# Patient Record
Sex: FEMALE | Race: WHITE
[De-identification: names, ages, dates, MRNs, and addresses within clinical notes are randomized per-mention and may not be internally consistent; named-entity substitution may affect disease eponyms.]

---

## 2019-04-03 ENCOUNTER — HOSPITAL ENCOUNTER (EMERGENCY)
Dept: HOSPITAL 10 - E/R | Age: 59
Discharge: HOME | End: 2019-04-03
Payer: SELF-PAY

## 2019-04-03 ENCOUNTER — HOSPITAL ENCOUNTER (EMERGENCY)
Dept: HOSPITAL 91 - E/R | Age: 59
Discharge: HOME | End: 2019-04-03
Payer: SELF-PAY

## 2019-04-03 VITALS — RESPIRATION RATE: 18 BRPM | SYSTOLIC BLOOD PRESSURE: 180 MMHG | HEART RATE: 82 BPM | DIASTOLIC BLOOD PRESSURE: 89 MMHG

## 2019-04-03 VITALS
BODY MASS INDEX: 29.62 KG/M2 | WEIGHT: 160.94 LBS | BODY MASS INDEX: 29.62 KG/M2 | HEIGHT: 62 IN | WEIGHT: 160.94 LBS | HEIGHT: 62 IN

## 2019-04-03 DIAGNOSIS — I10: Primary | ICD-10-CM

## 2019-04-03 DIAGNOSIS — R07.9: ICD-10-CM

## 2019-04-03 LAB
ADD MAN DIFF?: NO
ALANINE AMINOTRANSFERASE: 14 IU/L (ref 13–69)
ALBUMIN/GLOBULIN RATIO: 1.21
ALBUMIN: 4.6 G/DL (ref 3.3–4.9)
ALKALINE PHOSPHATASE: 91 IU/L (ref 42–121)
ANION GAP: 11 (ref 5–13)
ASPARTATE AMINO TRANSFERASE: 21 IU/L (ref 15–46)
B-TYPE NATRIURETIC PEPTIDE: 105 PG/ML (ref 0–125)
BASOPHIL #: 0 10^3/UL (ref 0–0.1)
BASOPHILS %: 0.7 % (ref 0–2)
BILIRUBIN,DIRECT: 0 MG/DL (ref 0–0.2)
BILIRUBIN,TOTAL: 0.6 MG/DL (ref 0.2–1.3)
BLOOD UREA NITROGEN: 12 MG/DL (ref 7–20)
CALCIUM: 9.6 MG/DL (ref 8.4–10.2)
CARBON DIOXIDE: 28 MMOL/L (ref 21–31)
CHLORIDE: 104 MMOL/L (ref 97–110)
CK INDEX: 0.9
CK-MB: 0.36 NG/ML (ref 0–2.4)
CREATINE KINASE: 42 IU/L (ref 23–200)
CREATININE: 0.6 MG/DL (ref 0.44–1)
EOSINOPHILS #: 0.1 10^3/UL (ref 0–0.5)
EOSINOPHILS %: 2.1 % (ref 0–7)
FREE THYROXINE INDEX (CALC): 1.99 UG/ML (ref 0.65–3.89)
GLOBULIN: 3.8 G/DL (ref 1.3–3.2)
GLUCOSE: 103 MG/DL (ref 70–220)
HEMATOCRIT: 37.2 % (ref 37–47)
HEMOGLOBIN: 12.2 G/DL (ref 12–16)
IMMATURE GRANS #M: 0.02 10^3/UL (ref 0–0.03)
IMMATURE GRANS % (M): 0.3 % (ref 0–0.43)
INR: 0.93
LYMPHOCYTES #: 2.1 10^3/UL (ref 0.8–2.9)
LYMPHOCYTES %: 36.1 % (ref 15–51)
MEAN CORPUSCULAR HEMOGLOBIN: 30.1 PG (ref 29–33)
MEAN CORPUSCULAR HGB CONC: 32.8 G/DL (ref 32–37)
MEAN CORPUSCULAR VOLUME: 91.9 FL (ref 82–101)
MEAN PLATELET VOLUME: 11.5 FL (ref 7.4–10.4)
MONOCYTE #: 0.4 10^3/UL (ref 0.3–0.9)
MONOCYTES %: 6.3 % (ref 0–11)
NEUTROPHIL #: 3.2 10^3/UL (ref 1.6–7.5)
NEUTROPHILS %: 54.5 % (ref 39–77)
NUCLEATED RED BLOOD CELLS #: 0 10^3/UL (ref 0–0)
NUCLEATED RED BLOOD CELLS%: 0 /100WBC (ref 0–0)
PARTIAL THROMBOPLASTIN TIME: 28.9 SEC (ref 23–35)
PLATELET COUNT: 153 10^3/UL (ref 140–415)
POTASSIUM: 4 MMOL/L (ref 3.5–5.1)
PROTIME: 12.6 SEC (ref 11.9–14.9)
PT RATIO: 1
RED BLOOD COUNT: 4.05 10^6/UL (ref 4.2–5.4)
RED CELL DISTRIBUTION WIDTH: 13.2 % (ref 11.5–14.5)
SODIUM: 143 MMOL/L (ref 135–144)
T3 UPTAKE: 25.2 % (ref 23.5–40.5)
T4 (THYROXINE): 7.9 UG/DL (ref 5.5–11)
TOTAL PROTEIN: 8.4 G/DL (ref 6.1–8.1)
TROPONIN-I: < 0.012 NG/ML (ref 0–0.12)
WHITE BLOOD COUNT: 5.9 10^3/UL (ref 4.8–10.8)

## 2019-04-03 PROCEDURE — 82550 ASSAY OF CK (CPK): CPT

## 2019-04-03 PROCEDURE — 93005 ELECTROCARDIOGRAM TRACING: CPT

## 2019-04-03 PROCEDURE — 84484 ASSAY OF TROPONIN QUANT: CPT

## 2019-04-03 PROCEDURE — 85730 THROMBOPLASTIN TIME PARTIAL: CPT

## 2019-04-03 PROCEDURE — 36415 COLL VENOUS BLD VENIPUNCTURE: CPT

## 2019-04-03 PROCEDURE — 96374 THER/PROPH/DIAG INJ IV PUSH: CPT

## 2019-04-03 PROCEDURE — 84436 ASSAY OF TOTAL THYROXINE: CPT

## 2019-04-03 PROCEDURE — 83880 ASSAY OF NATRIURETIC PEPTIDE: CPT

## 2019-04-03 PROCEDURE — 99285 EMERGENCY DEPT VISIT HI MDM: CPT

## 2019-04-03 PROCEDURE — 85610 PROTHROMBIN TIME: CPT

## 2019-04-03 PROCEDURE — 71045 X-RAY EXAM CHEST 1 VIEW: CPT

## 2019-04-03 PROCEDURE — 84479 ASSAY OF THYROID (T3 OR T4): CPT

## 2019-04-03 PROCEDURE — 80053 COMPREHEN METABOLIC PANEL: CPT

## 2019-04-03 PROCEDURE — 82553 CREATINE MB FRACTION: CPT

## 2019-04-03 PROCEDURE — 85025 COMPLETE CBC W/AUTO DIFF WBC: CPT

## 2019-04-03 RX ADMIN — LABETALOL HYDROCHLORIDE 1 MG: 5 INJECTION, SOLUTION INTRAVENOUS at 21:30

## 2019-04-03 RX ADMIN — NICARDIPINE HYDROCHLORIDE 1 MG: 30 CAPSULE ORAL at 23:03

## 2019-04-03 NOTE — ERD
ER Documentation


Chief Complaint


Chief Complaint





HTN





HPI


This is a very pleasant 58-year-old female that presents to the emergency de


partment brought in by her daughter for elevated blood pressure.  The patient 


indicates that 5 days prior to arrival she was seen and evaluated at [facility].  She was seen there for persistent migraines for several weeks.  At 


that time the patient was also noted to have elevated blood pressure but after 


receiving analgesic control her blood pressure resolved.  The patient was seen 


by a neurologist at [place] as she had a CT scan of her head that showed an 


abnormal finding but this was followed up with a CT with contrast and indicated 


that there was no intracerebral hemorrhage mass-effect or midline shift.  She 


subsequently was discharged and instructed to follow-up with her primary care 


physician.  She saw an ophthalmologist earlier today who indicated that the 


patient needed to go to her primary care physician as her blood pressure was 


elevated.  When she went to her primary care physician her blood pressure had a 


systolic of greater than 200 and she was instructed to come to the emergency 


department to be further evaluated.  The patient at this time denies any fever 


shaking or chills.  She denies any changes in vision.  She denies a headache.  


The patient does not take any antihypertensive medications.  She was prescribed 


Norco for her migraines but indicates she has not been taking that the only 


thing she has taken was Tylenol prior to arrival





ROS


All systems reviewed and are negative except as per history of present illness.





Allergies


Allergies:  


Coded Allergies:  


     No Known Allergy (Unverified , 4/3/19)





Physical Exam


Vitals





Vital Signs


  Date      Temp  Pulse  Resp  B/P (MAP)   Pulse Ox  O2          O2 Flow    FiO2


Time                                                 Delivery    Rate


    4/3/19  98.4     87    18      205/93       100


     18:22                          (130)





Physical Exam


Constitutional:Well-developed. Well-nourished.


HEENT:Normocephalic. Atraumatic.Pupils were equal round reactive to light. Moist


mucous membranes.No tonsillar exudates.  Funduscopy exam shows sharp optic disks


and venous pulsations were present


Neck: No nuchal rigidity. No lymphadenopathy. No posterior cervical spine 


tenderness or step-offs.


Respiratory: Not using accessory muscles of respiration.Lungs were clear to 


auscultation bilaterally. No rhonchi. No rales. No wheezing. 


Cardiovascular: Regular rate regular rhythm.No murmurs. No rubs were 


appreciated.S1, S2 normal. Distal pulses are palpable 2+ bilaterally.


GI: Abdomen was soft. Nontender. Non Distended. No pulsatile abdominal masses or


bruits. No rebound. No guarding. Bowel sounds were present and normal. 


Muscle skeletal: Full range of motion of both the upper and lower extremities 


bilaterally.Normal muscle tone.No assymetrical calf tenderness or swelling. 


Skin: No petechia, no purpura. No lesions on the palms or the soles of the feet.


No maculopapular rash.


NEURO: Patient was alert, awake, orientated x3.No facial droop. Gait observed 


and normal with no ataxia.Speech had regular rate and rhythm. No focal 


neurological deficits.


Result Diagram:  


4/3/19 2110                                                                     


          4/3/19 2110





Results 24 hrs





Laboratory Tests


              Test
                                  4/3/19
21:10


              White Blood Count                      5.9 10^3/ul


              Red Blood Count                       4.05 10^6/ul


              Hemoglobin                               12.2 g/dl


              Hematocrit                                  37.2 %


              Mean Corpuscular Volume                    91.9 fl


              Mean Corpuscular Hemoglobin                30.1 pg


              Mean Corpuscular Hemoglobin
Concent     32.8 g/dl 



              Red Cell Distribution Width                 13.2 %


              Platelet Count                         153 10^3/UL


              Mean Platelet Volume                       11.5 fl


              Immature Granulocytes %                    0.300 %


              Neutrophils %                               54.5 %


              Lymphocytes %                               36.1 %


              Monocytes %                                  6.3 %


              Eosinophils %                                2.1 %


              Basophils %                                  0.7 %


              Nucleated Red Blood Cells %            0.0 /100WBC


              Immature Granulocytes #              0.020 10^3/ul


              Neutrophils #                          3.2 10^3/ul


              Lymphocytes #                          2.1 10^3/ul


              Monocytes #                            0.4 10^3/ul


              Eosinophils #                          0.1 10^3/ul


              Basophils #                            0.0 10^3/ul


              Nucleated Red Blood Cells #            0.0 10^3/ul


              Prothrombin Time                          12.6 Sec


              Prothrombin Time Ratio                         1.0


              INR International Normalized
Ratio           0.93 



              Activated Partial
Thromboplast Time  Pending 



              Sodium Level                            143 mmol/L


              Potassium Level                         4.0 mmol/L


              Chloride Level                          104 mmol/L


              Carbon Dioxide Level                     28 mmol/L


              Anion Gap                                       11


              Blood Urea Nitrogen                       12 mg/dl


              Creatinine                              0.60 mg/dl


              Est Glomerular Filtrat Rate
mL/min   > 60 mL/min 



              Glucose Level                            103 mg/dl


              Calcium Level                            9.6 mg/dl


              Total Bilirubin                          0.6 mg/dl


              Direct Bilirubin                        0.00 mg/dl


              Indirect Bilirubin                       0.6 mg/dl


              Aspartate Amino Transf
(AST/SGOT)         21 IU/L 



              Alanine Aminotransferase
(ALT/SGPT)       14 IU/L 



              Alkaline Phosphatase                       91 IU/L


              Creatine Kinase                            42 IU/L


              Creatine Kinase Index                          0.9


              Creatinine Kinase MB (Mass)             0.36 ng/ml


              Troponin I                           < 0.012 ng/ml


              B-Type Natriuretic Peptide               105 PG/ML


              Total Protein                             8.4 g/dl


              Albumin                                   4.6 g/dl


              Globulin                                 3.80 g/dl


              Albumin/Globulin Ratio                        1.21


              Free Thyroxine Index                    1.99 ug/ml


              Thyroxine (T4)                           7.9 ug/dl


              Triiodothyronine (T3) Uptake                25.2 %





Current Medications


 Medications
   Dose
          Sig/Jamaal
       Start Time
   Status  Last


 (Trade)       Ordered        Route
 PRN     Stop Time              Admin
Dose


                              Reason                                Admin


 Labetalol      10 mg          ONCE  ONCE
    4/3/19        DC            4/3/19


HCl
                          IV
            21:30
 4/3/19                21:30



(Labetalol)                                  21:31








Procedures/MDM


This patient presented to the emergency department with severely elevated blood 


pressure. My differential diagnosis included but was not limited to conditions 


that could end-organ damage such as acute coronary syndrome, acute pulmonary 


edema, aortic dissection, subarachnoid hemorrhage, intracerebral hemorrhage, 


cerebral infarction, withdrawal syndromes from beta blockers, or states of 


catecholamine excess such as pheochromocytoma or drug intoxication. 





Ancillary lab work was obtained. There was no elevation in the BUN and crea


tinine to suggest acute renal failure. Electrolytes were normal. Cardiac enzyme 


was normal and the 12 lead EKG showed no acute ischemic changes or left 


ventricular hypertrophy. 


12 Lead EKG tracing ordered and reviewed by myself showed: 


Normal sinus rhythm of 73 bpm and no arrhythmia.


LA interval normal.


QRS duration normal.


No ST segment elevation


No ST segment depression. No changes consistent with acute ischemia. 








Given that the patient had an absence of cerebral, ocular, cardiac or renal 


damage the hypertensive urgency was treated with IV agents being labetalol in 


the emergency room with improvement of the patient's blood pressure. The patient


likely appeared to be complaint with primary care physician and will follow up 


with their PCP in the next 24-48 hours. They were instructed to return to the 


emergency department at anytime if there is any worsening of their condition 


such as development of chest pain or a headache. They were instructed to resume 


previous medication regimen or initiate a suitable medication regimen under care


of the PCP to enable proper monitoring for drug reactions. The patient was also 


informed on the adverse side effects and adverse drug interactions of the 


medications prescribed to them by myself. The patient gave informed consent to 


the prescription of the new medication.





Departure


Diagnosis:  


   Primary Impression:  


   Accelerated hypertension


Condition:  BRAIN Larry MD             Apr 3, 2019 22:00